# Patient Record
Sex: MALE | Race: WHITE | URBAN - METROPOLITAN AREA
[De-identification: names, ages, dates, MRNs, and addresses within clinical notes are randomized per-mention and may not be internally consistent; named-entity substitution may affect disease eponyms.]

---

## 2017-01-30 ENCOUNTER — OFFICE VISIT (OUTPATIENT)
Dept: FAMILY MEDICINE | Facility: CLINIC | Age: 37
End: 2017-01-30
Payer: COMMERCIAL

## 2017-01-30 VITALS
DIASTOLIC BLOOD PRESSURE: 83 MMHG | TEMPERATURE: 97.9 F | BODY MASS INDEX: 24.42 KG/M2 | WEIGHT: 175 LBS | HEART RATE: 69 BPM | SYSTOLIC BLOOD PRESSURE: 158 MMHG | OXYGEN SATURATION: 97 %

## 2017-01-30 DIAGNOSIS — I10 HYPERTENSION, GOAL BELOW 140/90: Primary | ICD-10-CM

## 2017-01-30 DIAGNOSIS — L20.81 ATOPIC NEURODERMATITIS: ICD-10-CM

## 2017-01-30 PROCEDURE — 99213 OFFICE O/P EST LOW 20 MIN: CPT | Performed by: FAMILY MEDICINE

## 2017-01-30 RX ORDER — TRIAMCINOLONE ACETONIDE 1 MG/G
CREAM TOPICAL 2 TIMES DAILY PRN
Qty: 45 G | Refills: 1 | Status: SHIPPED | OUTPATIENT
Start: 2017-01-30 | End: 2017-08-15

## 2017-01-30 RX ORDER — LISINOPRIL 20 MG/1
20 TABLET ORAL DAILY
Qty: 30 TABLET | Refills: 1 | Status: SHIPPED | OUTPATIENT
Start: 2017-01-30 | End: 2017-05-01

## 2017-01-30 ASSESSMENT — PAIN SCALES - GENERAL: PAINLEVEL: NO PAIN (0)

## 2017-01-30 NOTE — PROGRESS NOTES
SUBJECTIVE:                                                    Patricio Kemp is a 36 year old male who presents to clinic today for the following health issues:    Rash on chest and back, He has had in the past.   Requesting steroid cream  Blood pressure follow up and refill on his Lisinopril    Problem list and histories reviewed & adjusted, as indicated.      O: /83 mmHg  Pulse 69  Temp(Src) 97.9  F (36.6  C) (Oral)  Wt 175 lb (79.379 kg)  SpO2 97%    Red rash that is minimally visible   It does not appear to be typical tinea versicolor   It is recurrent per patient     It is pruritic       ICD-10-CM    1. Hypertension, goal below 140/90 I10 lisinopril (PRINIVIL/ZESTRIL) 20 MG tablet   2. Atopic neurodermatitis L20.81 triamcinolone (KENALOG) 0.1 % cream       Recheck in 3-4 weeks

## 2017-01-30 NOTE — NURSING NOTE
"Chief Complaint   Patient presents with     Derm Problem     Rash on chest and back -      Hypertension     Follow up       Initial /83 mmHg  Pulse 69  Temp(Src) 97.9  F (36.6  C) (Oral)  Wt 175 lb (79.379 kg)  SpO2 97% Estimated body mass index is 24.42 kg/(m^2) as calculated from the following:    Height as of 2/25/16: 5' 11\" (1.803 m).    Weight as of this encounter: 175 lb (79.379 kg).  BP completed using cuff size: vazquez Martinez MA      "

## 2017-01-30 NOTE — MR AVS SNAPSHOT
"              After Visit Summary   2017    Patricio Kemp    MRN: [id number]           Patient Information     Date Of Birth          1980        Visit Information        Provider Department      2017 5:20 PM Venkat Steele MD; [facility]        Today's Diagnoses     Hypertension, goal below 140/90    -  1     Atopic neurodermatitis            Follow-ups after your visit        Who to contact     If you have questions or need follow up information about today's clinic visit or your schedule please contact [facility] directly at [phone].  Normal or non-critical lab and imaging results will be communicated to you by MyChart, letter or phone within 4 business days after the clinic has received the results. If you do not hear from us within 7 days, please contact the clinic through [employer] or phone. If you have a critical or abnormal lab result, we will notify you by phone as soon as possible.  Submit refill requests through [employer] or call your pharmacy and they will forward the refill request to us. Please allow 3 business days for your refill to be completed.          Additional Information About Your Visit        MyChart Information     [employer] lets you send messages to your doctor, view your test results, renew your prescriptions, schedule appointments and more. To sign up, go to [email or web address] . Click on \"Log in\" on the left side of the screen, which will take you to the Welcome page. Then click on \"Sign up Now\" on the right side of the page.     You will be asked to enter the access code listed below, as well as some personal information. Please follow the directions to create your username and password.     Your access code is: [email or web address]  Expires: 2017  6:12 PM     Your access code will  in 90 days. If you need help or a new code, please call your [facility] or [phone].        Care " EveryWhere ID     This is your Care EveryWhere ID. This could be used by other organizations to access your [place] medical records  [id number]        Your Vitals Were     Pulse Temperature Pulse Oximetry             69 97.9  F (36.6  C) (Oral) 97%          Blood Pressure from Last 3 Encounters:   01/30/17 158/83   02/25/16 139/83   07/20/15 140/100    Weight from Last 3 Encounters:   01/30/17 175 lb (79.379 kg)   02/25/16 166 lb (75.297 kg)   07/20/15 172 lb (78.019 kg)              Today, you had the following     No orders found for display         Today's Medication Changes          These changes are accurate as of: 1/30/17  6:12 PM.  If you have any questions, ask your nurse or doctor.               Start taking these medicines.        Dose/Directions    triamcinolone 0.1 % cream   Commonly known as:  KENALOG   Used for:  Atopic neurodermatitis   Started by:  Venkat Steele MD        Apply topically 2 times daily as needed for irritation   Quantity:  45 g   Refills:  1         These medicines have changed or have updated prescriptions.        Dose/Directions    lisinopril 20 MG tablet   Commonly known as:  PRINIVIL/ZESTRIL   This may have changed:    - medication strength  - how much to take   Used for:  Hypertension, goal below 140/90   Changed by:  Venkat Steele MD        Dose:  20 mg   Take 1 tablet (20 mg) by mouth daily   Quantity:  30 tablet   Refills:  1            Where to get your medicines      These medications were sent to [facility]/pharmacy #[id number] - [facility] [address] AT CORNER OF [address] [place] [address]     Phone:  [phone]    - lisinopril 20 MG tablet  - triamcinolone 0.1 % cream             Primary Care Provider Office Phone # Fax #    [facility] [phone]       [address] [place], [place] [address]  [facility] [address]        Thank you!     Thank you for choosing [facility]  for your care. Our goal  is always to provide you with excellent care. Hearing back from our patients is one way we can continue to improve our services. Please take a few minutes to complete the written survey that you may receive in the mail after your visit with us. Thank you!             Your Updated Medication List - Protect others around you: Learn how to safely use, store and throw away your medicines at www.disposemymeds.org.          This list is accurate as of: 1/30/17  6:12 PM.  Always use your most recent med list.                   Brand Name Dispense Instructions for use    ADVIL PO      Take by mouth as needed       lisinopril 20 MG tablet    PRINIVIL/ZESTRIL    30 tablet    Take 1 tablet (20 mg) by mouth daily       triamcinolone 0.1 % cream    KENALOG    45 g    Apply topically 2 times daily as needed for irritation

## 2017-02-10 PROBLEM — I10 ESSENTIAL HYPERTENSION WITH GOAL BLOOD PRESSURE LESS THAN 140/90: Status: ACTIVE | Noted: 2017-02-10

## 2017-03-06 DIAGNOSIS — I10 HYPERTENSION, GOAL BELOW 140/90: ICD-10-CM

## 2017-03-06 NOTE — TELEPHONE ENCOUNTER
lisinopril (PRINIVIL/ZESTRIL) 20 MG tablet      Last Written Prescription Date: 1/30/17  Last Fill Quantity: 30, # refills: 1  Last Office Visit with G, [facility] or [facility] prescribing provider: 1/30/17       Potassium   Date Value Ref Range Status   02/25/2016 4.4 3.4 - 5.3 mmol/L Final     Creatinine   Date Value Ref Range Status   02/25/2016 0.79 0.66 - 1.25 mg/dL Final     BP Readings from Last 3 Encounters:   01/30/17 158/83   02/25/16 139/83   07/20/15 (!) 140/100

## 2017-03-07 ENCOUNTER — TELEPHONE (OUTPATIENT)
Dept: FAMILY MEDICINE | Facility: CLINIC | Age: 37
End: 2017-03-07

## 2017-03-07 RX ORDER — LISINOPRIL 20 MG/1
20 TABLET ORAL DAILY
Qty: 30 TABLET | Refills: 1 | OUTPATIENT
Start: 2017-03-07

## 2017-03-07 NOTE — LETTER
[address] [id number] [phone]          March 13, 2017    Patricio Kemp                                                                                                                     [facility] [facility] [address]            Dear Patricio,    We have tried to contact you, but have not been able to connect with you.    We were calling to let you know that we received a refill request for a medication.      You should have enough medication to last til the end of the month.     Please call us at [phone] to schedule an appointment prior to next refill.    Thank you        Sincerely,         Venkat Steele MD

## 2017-03-07 NOTE — TELEPHONE ENCOUNTER
Routing refill request to provider for review/approval because:  BP not at goal  Marilynn Marion, RN  [facility]

## 2017-03-07 NOTE — TELEPHONE ENCOUNTER
Patient should have enough pills to last him to the end of the month and he was due for a follow up bp in 4 weeks after last visit.  Please have him schedule an appointment while on meds

## 2017-03-10 NOTE — TELEPHONE ENCOUNTER
Message left on home number for patient to call back TC line.  NTBS for BP check with PCP.    Shelby DIAZ

## 2017-06-28 DIAGNOSIS — I10 HYPERTENSION, GOAL BELOW 140/90: ICD-10-CM

## 2017-06-28 NOTE — TELEPHONE ENCOUNTER
Routing refill request to provider for review/approval because:  Labs not current  BP out of range  Isabel refill was already done x1.     Blanca Herrera RN

## 2017-06-28 NOTE — TELEPHONE ENCOUNTER
lisinopril (PRINIVIL/ZESTRIL) 20 MG tablet      Last Written Prescription Date: 5-1-17  Last Fill Quantity: 30, # refills: 1  Last Office Visit with G, P or [facility] prescribing provider: 1-30-17       Potassium   Date Value Ref Range Status   02/25/2016 4.4 3.4 - 5.3 mmol/L Final     Creatinine   Date Value Ref Range Status   02/25/2016 0.79 0.66 - 1.25 mg/dL Final     BP Readings from Last 3 Encounters:   01/30/17 158/83   02/25/16 139/83   07/20/15 (!) 140/100

## 2017-06-29 RX ORDER — LISINOPRIL 20 MG/1
TABLET ORAL
Qty: 15 TABLET | Refills: 0 | Status: SHIPPED | OUTPATIENT
Start: 2017-06-29 | End: 2017-08-15

## 2017-08-15 ENCOUNTER — OFFICE VISIT (OUTPATIENT)
Dept: FAMILY MEDICINE | Facility: CLINIC | Age: 37
End: 2017-08-15
Payer: COMMERCIAL

## 2017-08-15 VITALS
WEIGHT: 163 LBS | TEMPERATURE: 97.4 F | BODY MASS INDEX: 22.08 KG/M2 | DIASTOLIC BLOOD PRESSURE: 76 MMHG | HEIGHT: 72 IN | HEART RATE: 61 BPM | OXYGEN SATURATION: 99 % | SYSTOLIC BLOOD PRESSURE: 139 MMHG

## 2017-08-15 DIAGNOSIS — H91.93 DEAF, BILATERAL: Primary | ICD-10-CM

## 2017-08-15 DIAGNOSIS — I10 HYPERTENSION, GOAL BELOW 140/90: ICD-10-CM

## 2017-08-15 LAB
ANION GAP SERPL CALCULATED.3IONS-SCNC: 8 MMOL/L (ref 3–14)
BUN SERPL-MCNC: 16 MG/DL (ref 7–30)
CALCIUM SERPL-MCNC: 9.4 MG/DL (ref 8.5–10.1)
CHLORIDE SERPL-SCNC: 101 MMOL/L (ref 94–109)
CO2 SERPL-SCNC: 28 MMOL/L (ref 20–32)
CREAT SERPL-MCNC: 0.97 MG/DL (ref 0.66–1.25)
GFR SERPL CREATININE-BSD FRML MDRD: 86 ML/MIN/1.7M2
GLUCOSE SERPL-MCNC: 84 MG/DL (ref 70–99)
POTASSIUM SERPL-SCNC: 4.6 MMOL/L (ref 3.4–5.3)
SODIUM SERPL-SCNC: 137 MMOL/L (ref 133–144)

## 2017-08-15 PROCEDURE — 99213 OFFICE O/P EST LOW 20 MIN: CPT | Performed by: FAMILY MEDICINE

## 2017-08-15 PROCEDURE — 36415 COLL VENOUS BLD VENIPUNCTURE: CPT | Performed by: FAMILY MEDICINE

## 2017-08-15 PROCEDURE — 80048 BASIC METABOLIC PNL TOTAL CA: CPT | Performed by: FAMILY MEDICINE

## 2017-08-15 PROCEDURE — T1013 SIGN LANG/ORAL INTERPRETER: HCPCS | Mod: U3 | Performed by: FAMILY MEDICINE

## 2017-08-15 RX ORDER — LISINOPRIL 20 MG/1
20 TABLET ORAL DAILY
Qty: 90 TABLET | Refills: 3 | Status: SHIPPED | OUTPATIENT
Start: 2017-08-15 | End: 2018-08-23

## 2017-08-15 NOTE — PROGRESS NOTES
"  SUBJECTIVE:                                                    Patricio Kemp is a 36 year old male who presents to clinic today for the following health issues:      Medication Followup of Lisinopril    Taking Medication as prescribed: yes    Side Effects:  Cloudy urine 3x in the lat yr    Medication Helping Symptoms:  yes     Lisinopril Refill    Problem list and histories reviewed & adjusted, as indicated.  Additional history: as documented    Ros: no chest pain, chest tightness   No sob   No leg edema   No abdominal pain     Denies fever or chills   Weight stable     O: /76  Pulse 61  Temp 97.4  F (36.3  C) (Oral)  Ht 5' 11.5\" (1.816 m)  Wt 163 lb (73.9 kg)  SpO2 99%  BMI 22.42 kg/m2    Wt Readings from Last 2 Encounters:   08/15/17 163 lb (73.9 kg)   01/30/17 175 lb (79.4 kg)     Hearing aids and using an      Patient does better with sign language     Chest wall normal to inspection and palpation. Good excursion bilaterally. Lungs clear to auscultation. Good air movement bilaterally without rales, wheezes, or rhonchi.   Regular rate and  rhythm. S1 and S2 normal, no murmurs, clicks, gallops or rubs. No edema or JVD.  \  The abdomen is soft without tenderness, guarding, mass, rebound or organomegaly. Bowel sounds are normal. No CVA tenderness or inguinal adenopathy noted.        ICD-10-CM    1. Deaf, bilateral H91.93    2. Hypertension, goal below 140/90 I10 lisinopril (PRINIVIL/ZESTRIL) 20 MG tablet     Basic metabolic panel           Reviewed and updated as needed this visit by clinical staff     Reviewed and updated as needed this visit by Provider             "

## 2017-08-15 NOTE — MR AVS SNAPSHOT
"              After Visit Summary   8/15/2017    Patricio Kemp    MRN: [id number]           Patient Information     Date Of Birth          1980        Visit Information        Provider Department      8/15/2017 9:20 AM Radha Wang; Venkat Steele MD [facility]        Today's Diagnoses     Deaf, bilateral    -  1    Hypertension, goal below 140/90           Follow-ups after your visit        Who to contact     If you have questions or need follow up information about today's clinic visit or your schedule please contact [facility] directly at [phone].  Normal or non-critical lab and imaging results will be communicated to you by MyChart, letter or phone within 4 business days after the clinic has received the results. If you do not hear from us within 7 days, please contact the clinic through [employer] or phone. If you have a critical or abnormal lab result, we will notify you by phone as soon as possible.  Submit refill requests through [employer] or call your pharmacy and they will forward the refill request to us. Please allow 3 business days for your refill to be completed.          Additional Information About Your Visit        MyChart Information     [employer] lets you send messages to your doctor, view your test results, renew your prescriptions, schedule appointments and more. To sign up, go to [email or web address] . Click on \"Log in\" on the left side of the screen, which will take you to the Welcome page. Then click on \"Sign up Now\" on the right side of the page.     You will be asked to enter the access code listed below, as well as some personal information. Please follow the directions to create your username and password.     Your access code is: [email or web address]  Expires: 2017 10:05 AM     Your access code will  in 90 days. If you need help or a new code, please call your [facility] or [phone].        Care " "EveryWhere ID     This is your Care EveryWhere ID. This could be used by other organizations to access your [place] medical records  [id number]        Your Vitals Were     Pulse Temperature Height Pulse Oximetry BMI (Body Mass Index)       61 97.4  F (36.3  C) (Oral) 5' 11.5\" (1.816 m) 99% 22.42 kg/m2        Blood Pressure from Last 3 Encounters:   08/15/17 139/76   01/30/17 158/83   02/25/16 139/83    Weight from Last 3 Encounters:   08/15/17 163 lb (73.9 kg)   01/30/17 175 lb (79.4 kg)   02/25/16 166 lb (75.3 kg)              We Performed the Following     Basic metabolic panel          Today's Medication Changes          These changes are accurate as of: 8/15/17 10:05 AM.  If you have any questions, ask your nurse or doctor.               These medicines have changed or have updated prescriptions.        Dose/Directions    lisinopril 20 MG tablet   Commonly known as:  PRINIVIL/ZESTRIL   This may have changed:  See the new instructions.   Used for:  Hypertension, goal below 140/90   Changed by:  Venkat Steele MD        Dose:  20 mg   Take 1 tablet (20 mg) by mouth daily   Quantity:  90 tablet   Refills:  3         Stop taking these medicines if you haven't already. Please contact your care team if you have questions.     triamcinolone 0.1 % cream   Commonly known as:  KENALOG   Stopped by:  Venkat Steele MD                Where to get your medicines      These medications were sent to [facility]/pharmacy #[id number] - [place], [place] - [address] AT COR[address] OF [facility]  [address] [place] [address]     Phone:  [phone]     lisinopril 20 MG tablet                Primary Care Provider Office Phone # Fax #    [facility] [phone]       [address] [place] [address]. .  [facility] [address]        Equal Access to Services     OLIVIA GILLESPIE AH: Hadii edu ku hadasho Soomaali, waaxda luqadaha, qaybta kaalmada adeegyada, waxay kingston frias . So [facility] " [phone].    ATENCIÓN: Si linnla zack, tiene a junior disposición servicios gratuitos de asistencia lingüística. Daniela al [phone].    We comply with applicable federal civil rights laws and [place] laws. We do not discriminate on the basis of race, color, national origin, age, disability sex, sexual orientation or gender identity.            Thank you!     Thank you for choosing [facility]  for your care. Our goal is always to provide you with excellent care. Hearing back from our patients is one way we can continue to improve our services. Please take a few minutes to complete the written survey that you may receive in the mail after your visit with us. Thank you!             Your Updated Medication List - Protect others around you: Learn how to safely use, store and throw away your medicines at www.disposemymeds.org.          This list is accurate as of: 8/15/17 10:05 AM.  Always use your most recent med list.                   Brand Name Dispense Instructions for use Diagnosis    ADVIL PO      Take by mouth as needed        lisinopril 20 MG tablet    PRINIVIL/ZESTRIL    90 tablet    Take 1 tablet (20 mg) by mouth daily    Hypertension, goal below 140/90

## 2017-08-15 NOTE — NURSING NOTE
"Chief Complaint   Patient presents with     Recheck Medication       Initial /76  Pulse 61  Temp 97.4  F (36.3  C) (Oral)  Ht 5' 11.5\" (1.816 m)  Wt 163 lb (73.9 kg)  SpO2 99%  BMI 22.42 kg/m2 Estimated body mass index is 22.42 kg/(m^2) as calculated from the following:    Height as of this encounter: 5' 11.5\" (1.816 m).    Weight as of this encounter: 163 lb (73.9 kg).  Medication Reconciliation: complete   Emeli Lorenzana MA      "

## 2018-08-23 DIAGNOSIS — I10 HYPERTENSION, GOAL BELOW 140/90: ICD-10-CM

## 2018-08-23 NOTE — TELEPHONE ENCOUNTER
"Requested Prescriptions   Pending Prescriptions Disp Refills     lisinopril (PRINIVIL/ZESTRIL) 20 MG tablet [Pharmacy Med Name: LISINOPRIL 20 MG TABLET] 90 tablet 1    Last Written Prescription Date:  8-15-17  Last Fill Quantity: 90,  # refills: 3   Last office visit: 8/15/2017 with prescribing provider:  8-15-17   Future Office Visit:     Sig: TAKE 1 TABLET (20 MG) BY MOUTH DAILY    ACE Inhibitors (Including Combos) Protocol Failed    8/23/2018  1:21 AM       Failed - Blood pressure under 140/90 in past 12 months    BP Readings from Last 3 Encounters:   08/15/17 139/76   01/30/17 158/83   02/25/16 139/83                Failed - Recent (12 mo) or future (30 days) visit within the authorizing provider's specialty    Patient had office visit in the last 12 months or has a visit in the next 30 days with authorizing provider or within the authorizing provider's specialty.  See \"Patient Info\" tab in inbasket, or \"Choose Columns\" in Meds & Orders section of the refill encounter.           Failed - Normal serum creatinine on file in past 12 months    Recent Labs   Lab Test  08/15/17   1006   CR  0.97            Failed - Normal serum potassium on file in past 12 months    Recent Labs   Lab Test  08/15/17   1006   POTASSIUM  4.6            Passed - Patient is age 18 or older          "

## 2018-08-24 RX ORDER — LISINOPRIL 20 MG/1
TABLET ORAL
Qty: 30 TABLET | Refills: 0 | Status: SHIPPED | OUTPATIENT
Start: 2018-08-24 | End: 2018-09-25

## 2018-08-24 NOTE — TELEPHONE ENCOUNTER
Medication is being filled for 1 time refill only due to:  Patient needs to be seen because it has been more than one year since last visit.   Jocy Aparicio RN

## 2018-09-25 DIAGNOSIS — I10 HYPERTENSION, GOAL BELOW 140/90: ICD-10-CM

## 2018-09-25 NOTE — TELEPHONE ENCOUNTER
"Requested Prescriptions   Pending Prescriptions Disp Refills     lisinopril (PRINIVIL/ZESTRIL) 20 MG tablet [Pharmacy Med Name: LISINOPRIL 20 MG TABLET] 30 tablet 0    Last Written Prescription Date:  8/24/18  Last Fill Quantity: 30,  # refills: 0   Last office visit: 8/15/2017 with prescribing provider:     Future Office Visit:     Sig: TAKE 1 TABLET BY MOUTH EVERY DAY    ACE Inhibitors (Including Combos) Protocol Failed    9/25/2018  1:23 AM       Failed - Blood pressure under 140/90 in past 12 months    BP Readings from Last 3 Encounters:   08/15/17 139/76   01/30/17 158/83   02/25/16 139/83                Failed - Recent (12 mo) or future (30 days) visit within the authorizing provider's specialty    Patient had office visit in the last 12 months or has a visit in the next 30 days with authorizing provider or within the authorizing provider's specialty.  See \"Patient Info\" tab in inbasket, or \"Choose Columns\" in Meds & Orders section of the refill encounter.           Failed - Normal serum creatinine on file in past 12 months    Recent Labs   Lab Test  08/15/17   1006   CR  0.97            Failed - Normal serum potassium on file in past 12 months    Recent Labs   Lab Test  08/15/17   1006   POTASSIUM  4.6            Passed - Patient is age 18 or older          "

## 2018-09-26 NOTE — TELEPHONE ENCOUNTER
Attempt # 1  Called patient at home number.[phone]  Was call answered?  No answer, left message to call nurse line at [phone]  Left message needs annual physical so can do refills     Brianne Shetty RN  [facility]

## 2018-09-27 NOTE — TELEPHONE ENCOUNTER
Attempt # 2  Called patient at home number.[phone]  Was call answered?  No answer, left message to call nurse line at [phone]    Brianne Shetty RN  [facility]

## 2018-09-28 NOTE — TELEPHONE ENCOUNTER
Attempt # 3    Called patient at [phone] (home). Left message on voicemail to return phone call to triage line at [phone].  Mary Lou Oliva RN [facility] Triage.      Routed to PCP to see if patient should get a short refill.      Mary Lou Oliva RN [facility] Triage.

## 2018-10-01 RX ORDER — LISINOPRIL 20 MG/1
TABLET ORAL
Qty: 30 TABLET | Refills: 0 | Status: SHIPPED | OUTPATIENT
Start: 2018-10-01 | End: 2018-10-04

## 2018-10-01 NOTE — TELEPHONE ENCOUNTER
Patient has an appointment for physical on Thursday with provider.    Routed to see if he can get a short refill until then.    Mary Lou Oliva RN [facility] Triage.

## 2018-10-04 ENCOUNTER — OFFICE VISIT (OUTPATIENT)
Dept: FAMILY MEDICINE | Facility: CLINIC | Age: 38
End: 2018-10-04
Payer: COMMERCIAL

## 2018-10-04 VITALS
HEIGHT: 71 IN | DIASTOLIC BLOOD PRESSURE: 78 MMHG | BODY MASS INDEX: 23.1 KG/M2 | OXYGEN SATURATION: 99 % | TEMPERATURE: 98 F | WEIGHT: 165 LBS | HEART RATE: 92 BPM | SYSTOLIC BLOOD PRESSURE: 124 MMHG

## 2018-10-04 DIAGNOSIS — R03.0 BORDERLINE HYPERTENSION: Primary | ICD-10-CM

## 2018-10-04 DIAGNOSIS — I10 HYPERTENSION, GOAL BELOW 140/90: ICD-10-CM

## 2018-10-04 DIAGNOSIS — Z23 NEED FOR PROPHYLACTIC VACCINATION AND INOCULATION AGAINST INFLUENZA: ICD-10-CM

## 2018-10-04 PROCEDURE — 36415 COLL VENOUS BLD VENIPUNCTURE: CPT | Performed by: FAMILY MEDICINE

## 2018-10-04 PROCEDURE — 99396 PREV VISIT EST AGE 40-64: CPT | Mod: 25 | Performed by: FAMILY MEDICINE

## 2018-10-04 PROCEDURE — 80048 BASIC METABOLIC PNL TOTAL CA: CPT | Performed by: FAMILY MEDICINE

## 2018-10-04 PROCEDURE — 90686 IIV4 VACC NO PRSV 0.5 ML IM: CPT | Performed by: FAMILY MEDICINE

## 2018-10-04 PROCEDURE — 90471 IMMUNIZATION ADMIN: CPT | Performed by: FAMILY MEDICINE

## 2018-10-04 RX ORDER — LISINOPRIL 20 MG/1
20 TABLET ORAL DAILY
Qty: 90 TABLET | Refills: 3 | Status: SHIPPED | OUTPATIENT
Start: 2018-10-04 | End: 2019-12-17

## 2018-10-04 ASSESSMENT — ENCOUNTER SYMPTOMS
ARTHRALGIAS: 0
WEAKNESS: 0
HEADACHES: 0
HEARTBURN: 0
PARESTHESIAS: 0
SHORTNESS OF BREATH: 0
DIZZINESS: 0
FREQUENCY: 0
HEMATOCHEZIA: 0
CHILLS: 0
CONSTIPATION: 0
DIARRHEA: 0
EYE PAIN: 0
SORE THROAT: 0
NAUSEA: 0
FEVER: 0
PALPITATIONS: 0
MYALGIAS: 0
ABDOMINAL PAIN: 0
HEMATURIA: 0
JOINT SWELLING: 0
NERVOUS/ANXIOUS: 0
COUGH: 0
DYSURIA: 0

## 2018-10-04 NOTE — PROGRESS NOTES
Injectable Influenza Immunization Documentation    1.  Is the person to be vaccinated sick today?   No    2. Does the person to be vaccinated have an allergy to a component   of the vaccine?   No  Egg Allergy Algorithm Link    3. Has the person to be vaccinated ever had a serious reaction   to influenza vaccine in the past?   No    4. Has the person to be vaccinated ever had Guillain-Barré syndrome?   No    Vaccine information supplied.    Form completed by [employer]ON

## 2018-10-04 NOTE — MR AVS SNAPSHOT
After Visit Summary   10/4/2018    Patricio Kemp    MRN: [id number]           Patient Information     Date Of Birth          1980        Visit Information        Provider Department      10/4/2018 5:20 PM Venkat Steele MD; [facility]        Today's Diagnoses     Borderline hypertension    -  1    Hypertension, goal below 140/90        Need for prophylactic vaccination and inoculation against influenza           Follow-ups after your visit        Follow-up notes from your care team     Return in about 1 year (around 10/4/2019) for BP Recheck, Physical Exam.      Who to contact     If you have questions or need follow up information about today's clinic visit or your schedule please contact [facility] directly at [phone].  Normal or non-critical lab and imaging results will be communicated to you by MyChart, letter or phone within 4 business days after the clinic has received the results. If you do not hear from us within 7 days, please contact the clinic through MyChart or phone. If you have a critical or abnormal lab result, we will notify you by phone as soon as possible.  Submit refill requests through [employer] or call your pharmacy and they will forward the refill request to us. Please allow 3 business days for your refill to be completed.          Additional Information About Your Visit        MyChart Information     [employer] gives you secure access to your electronic health record. If you see a primary care provider, you can also send messages to your care team and make appointments. If you have questions, please call your primary care clinic.  If you do not have a primary care provider, please call [phone] and they will assist you.        Care EveryWhere ID     This is your Care EveryWhere ID. This could be used by other organizations to access your [place] medical records  [id number]        Your Vitals Were   "   Pulse Temperature Height Pulse Oximetry BMI (Body Mass Index)       92 98  F (36.7  C) (Oral) 5' 10.75\" (1.797 m) 99% 23.18 kg/m2        Blood Pressure from Last 3 Encounters:   10/04/18 124/78   08/15/17 139/76   01/30/17 158/83    Weight from Last 3 Encounters:   10/04/18 165 lb (74.8 kg)   08/15/17 163 lb (73.9 kg)   01/30/17 175 lb (79.4 kg)              We Performed the Following     Basic metabolic panel     FLU VACCINE, SPLIT VIRUS, IM (QUADRIVALENT) [83279]- >3 YRS     Vaccine Administration, Initial [80801]          Today's Medication Changes          These changes are accurate as of 10/4/18  7:13 PM.  If you have any questions, ask your nurse or doctor.               These medicines have changed or have updated prescriptions.        Dose/Directions    lisinopril 20 MG tablet   Commonly known as:  PRINIVIL/ZESTRIL   This may have changed:  See the new instructions.   Used for:  Hypertension, goal below 140/90   Changed by:  Venkat Steele MD        Dose:  20 mg   Take 1 tablet (20 mg) by mouth daily   Quantity:  90 tablet   Refills:  3            Where to get your medicines      These medications were sent to [facility] - [place], [place] - [address]  [address], [place] [address]     Phone:  [phone]     lisinopril 20 MG tablet                Primary Care Provider Office Phone # Fax #    [facility] [phone]       [address] .  [facility] [address]        Equal Access to Services     [facility] VERNA AH: Hadii aad ku hadashlyssa Somanuel, waaxda luqadaha, qaybta kaalmada zenia, maribel painting. So [facility] [phone].    ATENCIÓN: Si habla español, tiene a junior disposición servicios gratuitos de asistencia lingüística. Llame al [phone].    We comply with applicable federal civil rights laws and [place] laws. We do not discriminate on the basis of race, color, national origin, age, disability, sex, " sexual orientation, or gender identity.            Thank you!     Thank you for choosing [facility]  for your care. Our goal is always to provide you with excellent care. Hearing back from our patients is one way we can continue to improve our services. Please take a few minutes to complete the written survey that you may receive in the mail after your visit with us. Thank you!             Your Updated Medication List - Protect others around you: Learn how to safely use, store and throw away your medicines at www.disposemymeds.org.          This list is accurate as of 10/4/18  7:13 PM.  Always use your most recent med list.                   Brand Name Dispense Instructions for use Diagnosis    ADVIL PO      Take by mouth as needed        lisinopril 20 MG tablet    PRINIVIL/ZESTRIL    90 tablet    Take 1 tablet (20 mg) by mouth daily    Hypertension, goal below 140/90

## 2018-10-04 NOTE — PROGRESS NOTES
SUBJECTIVE:   CC: Patricio Kemp is an 38 year old male who presents for preventative health visit.     Patient here with sign      He has had stress with driving   His bp has been normal     Physical   Annual:     Getting at least 3 servings of Calcium per day:  Yes    Bi-annual eye exam:  Yes    Dental care twice a year:  Yes    Sleep apnea or symptoms of sleep apnea:  None    Diet:  Low salt and Gluten-free/reduced    Frequency of exercise:  1 day/week    Duration of exercise:  Less than 15 minutes    Taking medications regularly:  Yes    Medication side effects:  None    Additional concerns today:  YES    Today's PHQ-2 Score:   PHQ-2 ( 1999 [employer]) 10/4/2018   Q1: Little interest or pleasure in doing things 0   Q2: Feeling down, depressed or hopeless 0   PHQ-2 Score 0   Q1: Little interest or pleasure in doing things Not at all   Q2: Feeling down, depressed or hopeless Not at all   PHQ-2 Score 0     Current Outpatient Prescriptions   Medication Sig Dispense Refill     lisinopril (PRINIVIL/ZESTRIL) 20 MG tablet TAKE 1 TABLET BY MOUTH EVERY DAY 30 tablet 0     Ibuprofen (ADVIL PO) Take by mouth as needed          Abuse: Current or Past(Physical, Sexual or Emotional)- No  Do you feel safe in your environment - Yes    Social History   Substance Use Topics     Smoking status: Former Smoker     Smokeless tobacco: Never Used     Alcohol use Yes     Alcohol Use 10/4/2018   If you drink alcohol do you typically have greater than 3 drinks per day OR greater than 7 drinks per week? No   No flowsheet data found.    Last PSA: No results found for: PSA    Reviewed orders with patient. Reviewed health maintenance and updated orders accordingly - Yes  BP Readings from Last 3 Encounters:   10/04/18 124/78   08/15/17 139/76   01/30/17 158/83    Wt Readings from Last 3 Encounters:   10/04/18 165 lb (74.8 kg)   08/15/17 163 lb (73.9 kg)   01/30/17 175 lb (79.4 kg)                  Patient Active Problem List    Diagnosis     Deaf     Borderline hypertension     Essential hypertension with goal blood pressure less than 140/90     Past Surgical History:   Procedure Laterality Date     ENT SURGERY      tonsilectomy       Social History   Substance Use Topics     Smoking status: Former Smoker     Smokeless tobacco: Former User     Types: Chew     Alcohol use Yes     Family History   Problem Relation Age of Onset     Glaucoma No family hx of      Macular Degeneration No family hx of          Current Outpatient Prescriptions   Medication Sig Dispense Refill     lisinopril (PRINIVIL/ZESTRIL) 20 MG tablet TAKE 1 TABLET BY MOUTH EVERY DAY 30 tablet 0     Ibuprofen (ADVIL PO) Take by mouth as needed        No Known Allergies    Reviewed and updated as needed this visit by clinical staff         Reviewed and updated as needed this visit by Provider        Past Medical History:   Diagnosis Date     Borderline hypertension      Deaf       Past Surgical History:   Procedure Laterality Date     ENT SURGERY      tonsilectomy            Review of Systems   Constitutional: Negative for chills and fever.   HENT: Positive for hearing loss. Negative for congestion, ear pain and sore throat.    Eyes: Negative for pain and visual disturbance.   Respiratory: Negative for cough and shortness of breath.    Cardiovascular: Negative for chest pain, palpitations and peripheral edema.   Gastrointestinal: Negative for abdominal pain, constipation, diarrhea, heartburn, hematochezia and nausea.   Genitourinary: Negative for discharge, dysuria, frequency, genital sores, hematuria, impotence and urgency.   Musculoskeletal: Negative for arthralgias, joint swelling and myalgias.   Skin: Negative for rash.   Neurological: Negative for dizziness, weakness, headaches and paresthesias.   Psychiatric/Behavioral: Negative for mood changes. The patient is not nervous/anxious.      CONSTITUTIONAL: NEGATIVE for fever, chills, change in weight  INTEGUMENTARY/SKIN:  "NEGATIVE for worrisome rashes, moles or lesions  EYES: NEGATIVE for vision changes or irritation  ENT: NEGATIVE for ear, mouth and throat problems  RESP: NEGATIVE for significant cough or SOB  CV: NEGATIVE for chest pain, palpitations or peripheral edema  GI: NEGATIVE for nausea, abdominal pain, heartburn, or change in bowel habits   male: negative for dysuria, hematuria, decreased urinary stream, erectile dysfunction, urethral discharge  MUSCULOSKELETAL: NEGATIVE for significant arthralgias or myalgia  NEURO: NEGATIVE for weakness, dizziness or paresthesias  PSYCHIATRIC: NEGATIVE for changes in mood or affect    OBJECTIVE:   /78  Pulse 92  Temp 98  F (36.7  C) (Oral)  Ht 5' 10.75\" (1.797 m)  Wt 165 lb (74.8 kg)  SpO2 99%  BMI 23.18 kg/m2    Physical Exam  GENERAL: healthy, alert and no distress  EYES: Eyes grossly normal to inspection, PERRL and conjunctivae and sclerae normal  HENT: ear canals and TM's normal, nose and mouth without ulcers or lesions  NECK: no adenopathy, no asymmetry, masses, or scars and thyroid normal to palpation  RESP: lungs clear to auscultation - no rales, rhonchi or wheezes  CV: regular rate and rhythm, normal S1 S2, no S3 or S4, no murmur, click or rub, no peripheral edema and peripheral pulses strong  ABDOMEN: soft, nontender, no hepatosplenomegaly, no masses and bowel sounds normal  MS: no gross musculoskeletal defects noted, no edema  SKIN: no suspicious lesions or rashes  NEURO: Normal strength and tone, mentation intact and speech normal  PSYCH: mentation appears normal, affect normal/bright    Diagnostic Test Results:  none     ASSESSMENT/PLAN:       ICD-10-CM    1. Borderline hypertension R03.0 Basic metabolic panel   2. Hypertension, goal below 140/90 I10 lisinopril (PRINIVIL/ZESTRIL) 20 MG tablet   3. Need for prophylactic vaccination and inoculation against influenza Z23 FLU VACCINE, SPLIT VIRUS, IM (QUADRIVALENT) [38370]- >3 YRS     Vaccine Administration, Initial " "[50041]       COUNSELING:   Reviewed preventive health counseling, as reflected in patient instructions       Regular exercise       Healthy diet/nutrition    BP Readings from Last 1 Encounters:   08/15/17 139/76     Estimated body mass index is 22.42 kg/(m^2) as calculated from the following:    Height as of 8/15/17: 5' 11.5\" (1.816 m).    Weight as of 8/15/17: 163 lb (73.9 kg).           reports that he has quit smoking. He has never used smokeless tobacco.      Counseling Resources:  ATP IV Guidelines  Pooled Cohorts Equation Calculator  FRAX Risk Assessment  ICSI Preventive Guidelines  Dietary Guidelines for Americans, 2010  USDA's MyPlate  ASA Prophylaxis  Lung CA Screening    Venkat Steele MD  [facility]  Answers for HPI/ROS submitted by the patient on 10/4/2018   PHQ-2 Score: 0    "

## 2018-10-05 LAB
ANION GAP SERPL CALCULATED.3IONS-SCNC: 8 MMOL/L (ref 3–14)
BUN SERPL-MCNC: 12 MG/DL (ref 7–30)
CALCIUM SERPL-MCNC: 9.5 MG/DL (ref 8.5–10.1)
CHLORIDE SERPL-SCNC: 103 MMOL/L (ref 94–109)
CO2 SERPL-SCNC: 29 MMOL/L (ref 20–32)
CREAT SERPL-MCNC: 0.91 MG/DL (ref 0.66–1.25)
GFR SERPL CREATININE-BSD FRML MDRD: >90 ML/MIN/1.7M2
GLUCOSE SERPL-MCNC: 80 MG/DL (ref 70–99)
POTASSIUM SERPL-SCNC: 4.7 MMOL/L (ref 3.4–5.3)
SODIUM SERPL-SCNC: 140 MMOL/L (ref 133–144)

## 2019-11-09 ENCOUNTER — HEALTH MAINTENANCE LETTER (OUTPATIENT)
Age: 39
End: 2019-11-09

## 2019-12-17 ENCOUNTER — TELEPHONE (OUTPATIENT)
Dept: FAMILY MEDICINE | Facility: CLINIC | Age: 39
End: 2019-12-17

## 2019-12-17 DIAGNOSIS — I10 HYPERTENSION, GOAL BELOW 140/90: ICD-10-CM

## 2019-12-17 RX ORDER — LISINOPRIL 20 MG/1
TABLET ORAL
Qty: 30 TABLET | Refills: 0 | Status: SHIPPED | OUTPATIENT
Start: 2019-12-17 | End: 2020-01-16

## 2019-12-17 NOTE — TELEPHONE ENCOUNTER
"Requested Prescriptions   Pending Prescriptions Disp Refills     lisinopril (PRINIVIL/ZESTRIL) 20 MG tablet [Pharmacy Med Name: LISINOPRIL 20 MG TABLET 20 TAB] 90 tablet 3     Sig: TAKE 1 TABLET (20MG) BY MOUTH DAILY   Last Written Prescription Date:  10-4-18  Last Fill Quantity: 90,  # refills: 3   Last office visit: 10/4/2018 with prescribing provider:  10-4-18   Future Office Visit:        ACE Inhibitors (Including Combos) Protocol Failed - 12/17/2019 12:56 PM        Failed - Blood pressure under 140/90 in past 12 months     BP Readings from Last 3 Encounters:   10/04/18 124/78   08/15/17 139/76   01/30/17 158/83                 Failed - Recent (12 mo) or future (30 days) visit within the authorizing provider's specialty     Patient has had an office visit with the authorizing provider or a provider within the authorizing providers department within the previous 12 mos or has a future within next 30 days. See \"Patient Info\" tab in inbasket, or \"Choose Columns\" in Meds & Orders section of the refill encounter.              Failed - Normal serum creatinine on file in past 12 months     Recent Labs   Lab Test 10/04/18  1812   CR 0.91             Failed - Normal serum potassium on file in past 12 months     Recent Labs   Lab Test 10/04/18  1812   POTASSIUM 4.7             Passed - Medication is active on med list        Passed - Patient is age 18 or older          "

## 2019-12-17 NOTE — TELEPHONE ENCOUNTER
Routing refill request to provider for review/approval because:  Patient needs to be seen because it has been more than 1 year since last office visit.  Mary Lou Oliva RN,BSN  [facility]

## 2020-12-06 ENCOUNTER — HEALTH MAINTENANCE LETTER (OUTPATIENT)
Age: 40
End: 2020-12-06

## 2021-09-26 ENCOUNTER — HEALTH MAINTENANCE LETTER (OUTPATIENT)
Age: 41
End: 2021-09-26

## 2022-01-15 ENCOUNTER — HEALTH MAINTENANCE LETTER (OUTPATIENT)
Age: 42
End: 2022-01-15

## 2023-04-23 ENCOUNTER — HEALTH MAINTENANCE LETTER (OUTPATIENT)
Age: 43
End: 2023-04-23